# Patient Record
Sex: FEMALE | Race: ASIAN | NOT HISPANIC OR LATINO
[De-identification: names, ages, dates, MRNs, and addresses within clinical notes are randomized per-mention and may not be internally consistent; named-entity substitution may affect disease eponyms.]

---

## 2017-06-21 PROBLEM — Z00.00 ENCOUNTER FOR PREVENTIVE HEALTH EXAMINATION: Status: ACTIVE | Noted: 2017-06-21

## 2017-07-06 ENCOUNTER — APPOINTMENT (OUTPATIENT)
Dept: UROLOGY | Facility: CLINIC | Age: 62
End: 2017-07-06

## 2017-07-06 VITALS
TEMPERATURE: 98 F | RESPIRATION RATE: 18 BRPM | DIASTOLIC BLOOD PRESSURE: 66 MMHG | HEIGHT: 63 IN | SYSTOLIC BLOOD PRESSURE: 98 MMHG | HEART RATE: 78 BPM | BODY MASS INDEX: 20.73 KG/M2 | WEIGHT: 117 LBS

## 2017-07-06 DIAGNOSIS — Z80.3 FAMILY HISTORY OF MALIGNANT NEOPLASM OF BREAST: ICD-10-CM

## 2017-07-06 DIAGNOSIS — F15.90 OTHER STIMULANT USE, UNSPECIFIED, UNCOMPLICATED: ICD-10-CM

## 2017-07-10 ENCOUNTER — CLINICAL ADVICE (OUTPATIENT)
Age: 62
End: 2017-07-10

## 2017-07-10 LAB
APPEARANCE: ABNORMAL
BACTERIA UR CULT: NORMAL
BACTERIA: NEGATIVE
BILIRUBIN URINE: NEGATIVE
BLOOD URINE: ABNORMAL
CALCIUM OXALATE CRYSTALS: ABNORMAL
COLOR: YELLOW
GLUCOSE QUALITATIVE U: NORMAL MG/DL
HYALINE CASTS: 2 /LPF
KETONES URINE: ABNORMAL
LEUKOCYTE ESTERASE URINE: NEGATIVE
MICROSCOPIC-UA: NORMAL
NITRITE URINE: NEGATIVE
PH URINE: 5
PROTEIN URINE: NEGATIVE MG/DL
RED BLOOD CELLS URINE: 4 /HPF
SPECIFIC GRAVITY URINE: 1.03
SQUAMOUS EPITHELIAL CELLS: 1 /HPF
URINE COMMENTS: NORMAL
UROBILINOGEN URINE: NORMAL MG/DL
WHITE BLOOD CELLS URINE: 1 /HPF

## 2017-08-10 ENCOUNTER — APPOINTMENT (OUTPATIENT)
Dept: UROLOGY | Facility: CLINIC | Age: 62
End: 2017-08-10
Payer: MEDICAID

## 2017-08-10 VITALS
TEMPERATURE: 98.6 F | SYSTOLIC BLOOD PRESSURE: 90 MMHG | RESPIRATION RATE: 18 BRPM | BODY MASS INDEX: 20.73 KG/M2 | HEIGHT: 63 IN | WEIGHT: 117 LBS | DIASTOLIC BLOOD PRESSURE: 70 MMHG | HEART RATE: 69 BPM

## 2017-08-10 VITALS — DIASTOLIC BLOOD PRESSURE: 70 MMHG | HEART RATE: 69 BPM | SYSTOLIC BLOOD PRESSURE: 90 MMHG | TEMPERATURE: 98.5 F

## 2017-08-10 PROCEDURE — 52000 CYSTOURETHROSCOPY: CPT

## 2017-10-05 ENCOUNTER — APPOINTMENT (OUTPATIENT)
Dept: UROLOGY | Facility: CLINIC | Age: 62
End: 2017-10-05
Payer: MEDICAID

## 2017-10-05 VITALS
DIASTOLIC BLOOD PRESSURE: 60 MMHG | WEIGHT: 117 LBS | HEART RATE: 79 BPM | BODY MASS INDEX: 20.73 KG/M2 | RESPIRATION RATE: 18 BRPM | HEIGHT: 63 IN | OXYGEN SATURATION: 99 % | SYSTOLIC BLOOD PRESSURE: 90 MMHG | TEMPERATURE: 97.6 F

## 2017-10-05 DIAGNOSIS — R39.15 URGENCY OF URINATION: ICD-10-CM

## 2017-10-05 DIAGNOSIS — R35.0 FREQUENCY OF MICTURITION: ICD-10-CM

## 2017-10-05 DIAGNOSIS — R35.1 NOCTURIA: ICD-10-CM

## 2017-10-05 PROCEDURE — 99213 OFFICE O/P EST LOW 20 MIN: CPT

## 2018-10-04 ENCOUNTER — APPOINTMENT (OUTPATIENT)
Dept: UROLOGY | Facility: CLINIC | Age: 63
End: 2018-10-04

## 2021-09-23 ENCOUNTER — APPOINTMENT (OUTPATIENT)
Dept: UROLOGY | Facility: CLINIC | Age: 66
End: 2021-09-23
Payer: MEDICARE

## 2021-09-23 VITALS
RESPIRATION RATE: 14 BRPM | HEIGHT: 63 IN | WEIGHT: 112 LBS | BODY MASS INDEX: 19.84 KG/M2 | SYSTOLIC BLOOD PRESSURE: 103 MMHG | DIASTOLIC BLOOD PRESSURE: 63 MMHG | TEMPERATURE: 97.9 F | HEART RATE: 77 BPM

## 2021-09-23 PROCEDURE — 99203 OFFICE O/P NEW LOW 30 MIN: CPT

## 2021-09-23 NOTE — REVIEW OF SYSTEMS
[see HPI] : see HPI [Negative] : Heme/Lymph [Strong urge to urinate] : strong urge to urinate [Bladder pressure] : experiences bladder pressure

## 2021-09-26 NOTE — HISTORY OF PRESENT ILLNESS
[FreeTextEntry1] : 63 yo Faroese speaking F presents with a longstanding history of LUTS, specifically urinary frequency (q1-2 hours), urgency, nocturia (1-2/night). 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 Usually only a small amount of urine will come out and a short time later, will feel like she needs to urinate again. Denies any urge incontinence, dysuria, gross hematuria. Only drinks 1 large cup of coffee in the morning and never drinks water. Urine usually yellow. Has had issues in the past with hematochezia and constipation. CT done in mid- at [facility] radiology was unremarkable. Postmenopausal, 2 children, \par \par UA was noted to have microhematuria and pt underwent cystoscopy at last visit which was unremarkable. Patient also had CT urogram which was negative for any stones or tumors or hydronephrosis. Since last visit, patient states that she has been making more of an effort to drink more water. She's not sure if symptoms improved but has been making more of an effort to ignore the sense of urgency and do timed voiding. \par \par 21 Interval history: Pt hasn't been seen since 2017\par Noticed worsening of LUTS lately - urgency with small volumes\par Voiding between clients at nail salon, no nocturia \par Pelvic pressure\par No gross hematuria\par No UTIs since last visit\par Drinks about 3-4 cups of water, 2 cups of coffee\par Symptoms seem worse with coffee\par Not sexually active\par normal bowel movements\par last saw gyn in January
76

## 2021-09-26 NOTE — PHYSICAL EXAM
[General Appearance - Well Developed] : well developed [General Appearance - Well Nourished] : well nourished [Normal Appearance] : normal appearance [Well Groomed] : well groomed [General Appearance - In No Acute Distress] : no acute distress [Edema] : no peripheral edema [Respiration, Rhythm And Depth] : normal respiratory rhythm and effort [Exaggerated Use Of Accessory Muscles For Inspiration] : no accessory muscle use [Abdomen Soft] : soft [Abdomen Tenderness] : non-tender [Costovertebral Angle Tenderness] : no ~M costovertebral angle tenderness [Urethral Meatus] : normal urethra [Urinary Bladder Findings] : the bladder was normal on palpation [External Female Genitalia] : normal external genitalia [FreeTextEntry1] : neg prolapse, neg CST [Normal Station and Gait] : the gait and station were normal for the patient's age [] : no rash [Skin Lesions] : no skin lesions [No Focal Deficits] : no focal deficits [Motor Exam] : the motor exam was normal [Affect] : the affect was normal [Oriented To Time, Place, And Person] : oriented to person, place, and time [Mood] : the mood was normal [Not Anxious] : not anxious [No Palpable Adenopathy] : no palpable adenopathy

## 2021-09-26 NOTE — ASSESSMENT
[FreeTextEntry1] : 65 yo F with LUTS\par \par - UA and culture\par - PVR = 0ml\par - Discussed possible etiologies for LUTS. Discussed ways to manage them including behavioral modifications such as adequate hydration, controlling constipation, restricting fluids in the evening. Emphasized cutting back on caffeine intake

## 2021-09-29 LAB
APPEARANCE: ABNORMAL
BACTERIA UR CULT: NORMAL
BACTERIA: NEGATIVE
BILIRUBIN URINE: NEGATIVE
BLOOD URINE: NEGATIVE
CALCIUM OXALATE CRYSTALS: ABNORMAL
COLOR: YELLOW
GLUCOSE QUALITATIVE U: NEGATIVE
HYALINE CASTS: 1 /LPF
KETONES URINE: NEGATIVE
LEUKOCYTE ESTERASE URINE: NEGATIVE
MICROSCOPIC-UA: NORMAL
NITRITE URINE: NEGATIVE
PH URINE: 5.5
PROTEIN URINE: NORMAL
RED BLOOD CELLS URINE: 14 /HPF
SPECIFIC GRAVITY URINE: 1.03
SQUAMOUS EPITHELIAL CELLS: 1 /HPF
UROBILINOGEN URINE: NORMAL
WHITE BLOOD CELLS URINE: 2 /HPF

## 2021-10-06 ENCOUNTER — NON-APPOINTMENT (OUTPATIENT)
Age: 66
End: 2021-10-06

## 2021-10-25 ENCOUNTER — APPOINTMENT (OUTPATIENT)
Age: 66
End: 2021-10-25
Payer: MEDICARE

## 2021-10-25 PROCEDURE — 52000 CYSTOURETHROSCOPY: CPT

## 2022-03-16 ENCOUNTER — TRANSCRIPTION ENCOUNTER (OUTPATIENT)
Age: 67
End: 2022-03-16

## 2022-08-31 ENCOUNTER — NON-APPOINTMENT (OUTPATIENT)
Age: 67
End: 2022-08-31

## 2022-10-27 ENCOUNTER — APPOINTMENT (OUTPATIENT)
Age: 67
End: 2022-10-27

## 2022-10-27 VITALS
HEIGHT: 63 IN | WEIGHT: 112 LBS | SYSTOLIC BLOOD PRESSURE: 95 MMHG | DIASTOLIC BLOOD PRESSURE: 59 MMHG | BODY MASS INDEX: 19.84 KG/M2 | HEART RATE: 75 BPM | TEMPERATURE: 97.7 F

## 2022-10-27 DIAGNOSIS — R31.29 OTHER MICROSCOPIC HEMATURIA: ICD-10-CM

## 2022-10-27 DIAGNOSIS — R39.9 UNSPECIFIED SYMPTOMS AND SIGNS INVOLVING THE GENITOURINARY SYSTEM: ICD-10-CM

## 2022-10-27 PROCEDURE — 99213 OFFICE O/P EST LOW 20 MIN: CPT

## 2022-11-01 LAB
APPEARANCE: CLEAR
BACTERIA UR CULT: NORMAL
BACTERIA: NEGATIVE
BILIRUBIN URINE: NEGATIVE
BLOOD URINE: NEGATIVE
COLOR: NORMAL
GLUCOSE QUALITATIVE U: NEGATIVE
HYALINE CASTS: 0 /LPF
KETONES URINE: NEGATIVE
LEUKOCYTE ESTERASE URINE: NEGATIVE
MICROSCOPIC-UA: NORMAL
NITRITE URINE: NEGATIVE
PH URINE: 7.5
PROTEIN URINE: NEGATIVE
RED BLOOD CELLS URINE: 1 /HPF
SPECIFIC GRAVITY URINE: 1.01
SQUAMOUS EPITHELIAL CELLS: 1 /HPF
UROBILINOGEN URINE: NORMAL
WHITE BLOOD CELLS URINE: 0 /HPF

## 2022-11-01 NOTE — ASSESSMENT
[FreeTextEntry1] : 68 yo F with history of microhematuria, LUTS\par \par - UA and culture today\par - Reaffirmed behavioral modifications for LUTS control\par

## 2022-11-01 NOTE — HISTORY OF PRESENT ILLNESS
[FreeTextEntry1] : 63 yo Japanese speaking F presents with a longstanding history of LUTS, specifically urinary frequency (q1-2 hours), urgency, nocturia (1-2/night). 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 Usually only a small amount of urine will come out and a short time later, will feel like she needs to urinate again. Denies any urge incontinence, dysuria, gross hematuria. Only drinks 1 large cup of coffee in the morning and never drinks water. Urine usually yellow. Has had issues in the past with hematochezia and constipation. CT done in mid- at [facility] radiology was unremarkable. Postmenopausal, 2 children, \par \par UA was noted to have microhematuria and pt underwent cystoscopy at last visit which was unremarkable. Patient also had CT urogram which was negative for any stones or tumors or hydronephrosis. Since last visit, patient states that she has been making more of an effort to drink more water. She's not sure if symptoms improved but has been making more of an effort to ignore the sense of urgency and do timed voiding. \par \par 21 Interval history: Pt hasn't been seen since 2017\par Noticed worsening of LUTS lately - urgency with small volumes\par Voiding between clients at nail salon, no nocturia \par Pelvic pressure\par No gross hematuria\par No UTIs since last visit\par Drinks about 3-4 cups of water, 2 cups of coffee\par Symptoms seem worse with coffee\par Not sexually active\par normal bowel movements\par last saw gyn in January\par \par 10/27/22 Interval history: no changes in health\par Stable LUTS but not bothersome\par No gross hematuria\par No UTI episodes\par Of note hematuria workup last year was negative

## 2022-11-01 NOTE — PHYSICAL EXAM
[General Appearance - Well Developed] : well developed [General Appearance - Well Nourished] : well nourished [Normal Appearance] : normal appearance [Well Groomed] : well groomed [General Appearance - In No Acute Distress] : no acute distress [Abdomen Soft] : soft [Abdomen Tenderness] : non-tender [Costovertebral Angle Tenderness] : no ~M costovertebral angle tenderness [Urethral Meatus] : normal urethra [Urinary Bladder Findings] : the bladder was normal on palpation [External Female Genitalia] : normal external genitalia [Skin Lesions] : no skin lesions [Edema] : no peripheral edema [] : no respiratory distress [Respiration, Rhythm And Depth] : normal respiratory rhythm and effort [Exaggerated Use Of Accessory Muscles For Inspiration] : no accessory muscle use [Oriented To Time, Place, And Person] : oriented to person, place, and time [Affect] : the affect was normal [Mood] : the mood was normal [Not Anxious] : not anxious [Normal Station and Gait] : the gait and station were normal for the patient's age [No Focal Deficits] : no focal deficits [No Palpable Adenopathy] : no palpable adenopathy [Motor Exam] : the motor exam was normal [FreeTextEntry1] : neg prolapse, neg CST - deferred today

## 2022-12-29 ENCOUNTER — NON-APPOINTMENT (OUTPATIENT)
Age: 67
End: 2022-12-29

## 2022-12-31 ENCOUNTER — NON-APPOINTMENT (OUTPATIENT)
Age: 67
End: 2022-12-31

## 2023-01-12 ENCOUNTER — NON-APPOINTMENT (OUTPATIENT)
Age: 68
End: 2023-01-12

## 2023-12-27 ENCOUNTER — NON-APPOINTMENT (OUTPATIENT)
Age: 68
End: 2023-12-27

## 2024-10-07 ENCOUNTER — APPOINTMENT (OUTPATIENT)
Dept: UROLOGY | Facility: CLINIC | Age: 69
End: 2024-10-07

## 2024-10-07 VITALS
DIASTOLIC BLOOD PRESSURE: 64 MMHG | SYSTOLIC BLOOD PRESSURE: 99 MMHG | WEIGHT: 112 LBS | HEIGHT: 63 IN | BODY MASS INDEX: 19.84 KG/M2 | TEMPERATURE: 96.6 F | HEART RATE: 99 BPM | RESPIRATION RATE: 16 BRPM | OXYGEN SATURATION: 95 %

## 2024-10-07 DIAGNOSIS — R39.9 UNSPECIFIED SYMPTOMS AND SIGNS INVOLVING THE GENITOURINARY SYSTEM: ICD-10-CM

## 2024-10-07 DIAGNOSIS — Z86.39 PERSONAL HISTORY OF OTHER ENDOCRINE, NUTRITIONAL AND METABOLIC DISEASE: ICD-10-CM

## 2024-10-07 PROCEDURE — G2211 COMPLEX E/M VISIT ADD ON: CPT

## 2024-10-07 PROCEDURE — 99213 OFFICE O/P EST LOW 20 MIN: CPT

## 2024-10-09 LAB
APPEARANCE: CLEAR
BACTERIA UR CULT: NORMAL
BACTERIA: NEGATIVE /HPF
BILIRUBIN URINE: NEGATIVE
BLOOD URINE: NEGATIVE
COLOR: YELLOW
GLUCOSE QUALITATIVE U: NEGATIVE
HYALINE CASTS: NORMAL
KETONES URINE: NEGATIVE
LEUKOCYTE ESTERASE URINE: NEGATIVE
MICROSCOPIC-UA: NORMAL
NITRITE URINE: NEGATIVE
PH URINE: 5.5
PROTEIN URINE: NEGATIVE
RED BLOOD CELLS URINE: 0 /HPF
SPECIFIC GRAVITY URINE: >=1.03
SQUAMOUS EPITHELIAL CELLS: PRESENT
UROBILINOGEN URINE: NORMAL
WHITE BLOOD CELLS URINE: 0 /HPF

## 2024-10-13 ENCOUNTER — NON-APPOINTMENT (OUTPATIENT)
Age: 69
End: 2024-10-13

## 2024-10-20 NOTE — HISTORY OF PRESENT ILLNESS
[FreeTextEntry1] : 61 yo Maori speaking F presents with a longstanding history of LUTS, specifically urinary frequency (q1-2 hours), urgency, nocturia (1-2/night). 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 Usually only a small amount of urine will come out and a short time later, will feel like she needs to urinate again. Denies any urge incontinence, dysuria, gross hematuria. Only drinks 1 large cup of coffee in the morning and never drinks water. Urine usually yellow. Has had issues in the past with hematochezia and constipation. CT done in mid- at [facility] radiology was unremarkable. Postmenopausal, 2 children,   UA was noted to have microhematuria and pt underwent cystoscopy at last visit which was unremarkable. Patient also had CT urogram which was negative for any stones or tumors or hydronephrosis. Since last visit, patient states that she has been making more of an effort to drink more water. She's not sure if symptoms improved but has been making more of an effort to ignore the sense of urgency and do timed voiding.   21 Interval history: Pt hasn't been seen since 2017 Noticed worsening of LUTS lately - urgency with small volumes Voiding between clients at nail salon, no nocturia  Pelvic pressure No gross hematuria No UTIs since last visit Drinks about 3-4 cups of water, 2 cups of coffee Symptoms seem worse with coffee Not sexually active normal bowel movements last saw gyn in January  10/27/22 Interval history: no changes in health Stable LUTS but not bothersome No gross hematuria No UTI episodes Of note hematuria workup last year was negative  10/7/24 Interval history: Pt hasn't been seen since  Presents with bothersome LUTS - incomplete bladder emptying sensation, some weak flow and straining Voiding every 3 hours, nocturia 2-3/night no incontinence no dysuria, no gross hematuria no symptomatic UTI Drinks 1 bottle of water, 1 cup of coffee daily last saw gyn in July normal bowel movements

## 2024-10-20 NOTE — ASSESSMENT
[FreeTextEntry1] : 70 yo F with LUTS  - PVR = 0ml - Discussed possible etiologies for LUTS. Discussed ways to manage them including behavioral modifications such as adequate hydration, controlling constipation, restricting fluids in the evening - UA, and culture   Patient is being seen today for evaluation and management of a chronic and longitudinal ongoing condition and I am of the primary treating physician.

## 2024-10-20 NOTE — HISTORY OF PRESENT ILLNESS
[FreeTextEntry1] : 63 yo Occitan speaking F presents with a longstanding history of LUTS, specifically urinary frequency (q1-2 hours), urgency, nocturia (1-2/night). 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 Usually only a small amount of urine will come out and a short time later, will feel like she needs to urinate again. Denies any urge incontinence, dysuria, gross hematuria. Only drinks 1 large cup of coffee in the morning and never drinks water. Urine usually yellow. Has had issues in the past with hematochezia and constipation. CT done in mid- at [facility] radiology was unremarkable. Postmenopausal, 2 children,   UA was noted to have microhematuria and pt underwent cystoscopy at last visit which was unremarkable. Patient also had CT urogram which was negative for any stones or tumors or hydronephrosis. Since last visit, patient states that she has been making more of an effort to drink more water. She's not sure if symptoms improved but has been making more of an effort to ignore the sense of urgency and do timed voiding.   21 Interval history: Pt hasn't been seen since 2017 Noticed worsening of LUTS lately - urgency with small volumes Voiding between clients at nail salon, no nocturia  Pelvic pressure No gross hematuria No UTIs since last visit Drinks about 3-4 cups of water, 2 cups of coffee Symptoms seem worse with coffee Not sexually active normal bowel movements last saw gyn in January  10/27/22 Interval history: no changes in health Stable LUTS but not bothersome No gross hematuria No UTI episodes Of note hematuria workup last year was negative  10/7/24 Interval history: Pt hasn't been seen since  Presents with bothersome LUTS - incomplete bladder emptying sensation, some weak flow and straining Voiding every 3 hours, nocturia 2-3/night no incontinence no dysuria, no gross hematuria no symptomatic UTI Drinks 1 bottle of water, 1 cup of coffee daily last saw gyn in July normal bowel movements

## 2024-10-20 NOTE — PHYSICAL EXAM
[Normal Appearance] : normal appearance [Well Groomed] : well groomed [General Appearance - In No Acute Distress] : no acute distress [Edema] : no peripheral edema [Respiration, Rhythm And Depth] : normal respiratory rhythm and effort [Abdomen Soft] : soft [Exaggerated Use Of Accessory Muscles For Inspiration] : no accessory muscle use [Costovertebral Angle Tenderness] : no ~M costovertebral angle tenderness [Abdomen Tenderness] : non-tender [Urethral Meatus] : normal urethra [Urinary Bladder Findings] : the bladder was normal on palpation [Normal Station and Gait] : the gait and station were normal for the patient's age [External Female Genitalia] : normal external genitalia [] : no rash [No Focal Deficits] : no focal deficits [Oriented To Time, Place, And Person] : oriented to person, place, and time [Affect] : the affect was normal [Mood] : the mood was normal [No Palpable Adenopathy] : no palpable adenopathy